# Patient Record
Sex: FEMALE | Race: WHITE | NOT HISPANIC OR LATINO | Employment: OTHER | URBAN - METROPOLITAN AREA
[De-identification: names, ages, dates, MRNs, and addresses within clinical notes are randomized per-mention and may not be internally consistent; named-entity substitution may affect disease eponyms.]

---

## 2021-01-21 ENCOUNTER — EST. PATIENT EMERGENCY (OUTPATIENT)
Dept: URBAN - METROPOLITAN AREA CLINIC 32 | Facility: CLINIC | Age: 66
End: 2021-01-21

## 2021-01-21 DIAGNOSIS — H02.882: ICD-10-CM

## 2021-01-21 DIAGNOSIS — H02.885: ICD-10-CM

## 2021-01-21 PROCEDURE — 92012 INTRM OPH EXAM EST PATIENT: CPT

## 2021-01-21 ASSESSMENT — KERATOMETRY
OD_K1POWER_DIOPTERS: 45.25
OS_AXISANGLE_DEGREES: 41
OS_K1POWER_DIOPTERS: 47.75
OS_K2POWER_DIOPTERS: 47.25
OD_K2POWER_DIOPTERS: 44.75
OD_AXISANGLE_DEGREES: 149
OD_AXISANGLE2_DEGREES: 59
OS_AXISANGLE2_DEGREES: 131

## 2021-01-21 ASSESSMENT — VISUAL ACUITY
OD_CC: 20/25+2
OS_CC: J1+
OD_CC: J1+
OS_CC: 20/20-2

## 2021-01-21 ASSESSMENT — TONOMETRY
OD_IOP_MMHG: 12
OS_IOP_MMHG: 11

## 2021-03-29 ENCOUNTER — REFRACTION ONLY (OUTPATIENT)
Dept: URBAN - METROPOLITAN AREA CLINIC 32 | Facility: CLINIC | Age: 66
End: 2021-03-29

## 2021-03-29 DIAGNOSIS — H02.882: ICD-10-CM

## 2021-03-29 DIAGNOSIS — H02.885: ICD-10-CM

## 2021-03-29 PROCEDURE — 92015 DETERMINE REFRACTIVE STATE: CPT

## 2021-03-29 ASSESSMENT — KERATOMETRY
OD_AXISANGLE2_DEGREES: 59
OS_K2POWER_DIOPTERS: 47.25
OD_AXISANGLE_DEGREES: 149
OS_AXISANGLE2_DEGREES: 131
OS_AXISANGLE_DEGREES: 41
OD_K1POWER_DIOPTERS: 45.25
OD_K2POWER_DIOPTERS: 44.75
OS_K1POWER_DIOPTERS: 47.75

## 2024-04-24 ENCOUNTER — APPOINTMENT (RX ONLY)
Dept: URBAN - METROPOLITAN AREA CLINIC 125 | Facility: CLINIC | Age: 69
Setting detail: DERMATOLOGY
End: 2024-04-24

## 2024-04-24 DIAGNOSIS — L90.5 SCAR CONDITIONS AND FIBROSIS OF SKIN: ICD-10-CM | Status: RESOLVING

## 2024-04-24 PROBLEM — D48.5 NEOPLASM OF UNCERTAIN BEHAVIOR OF SKIN: Status: ACTIVE | Noted: 2024-04-24

## 2024-04-24 PROCEDURE — ? COUNSELING

## 2024-04-24 PROCEDURE — 99202 OFFICE O/P NEW SF 15 MIN: CPT

## 2024-04-24 ASSESSMENT — LOCATION SIMPLE DESCRIPTION DERM: LOCATION SIMPLE: RIGHT CHEEK

## 2024-04-24 ASSESSMENT — LOCATION DETAILED DESCRIPTION DERM: LOCATION DETAILED: RIGHT SUPERIOR PREAURICULAR CHEEK

## 2024-04-24 ASSESSMENT — LOCATION ZONE DERM: LOCATION ZONE: FACE

## 2024-04-24 NOTE — HPI: SCAR
How Severe Is Your Scar?: mild
Is This A New Presentation, Or A Follow-Up?: Scar
Additional History: Pt saw  for face lift revision in January. Scar was red . Pt did fraxel laser 5 days ago and scar has started to resolve.